# Patient Record
(demographics unavailable — no encounter records)

---

## 2025-02-04 NOTE — PHYSICAL EXAM
[Chaperone Present] : A chaperone was present in the examining room during all aspects of the physical examination [[id number]] : A chaperone was present during the pelvic exam. [No Acute Distress] : in no acute distress [Well developed] : well developed [Well Nourished] : ~L well nourished [Good Hygeine] : demonstrates good hygeine [Oriented x3] : oriented to person, place, and time [Normal Memory] : ~T memory was ~L unimpaired [Normal Mood/Affect] : mood and affect are normal [Warm and Dry] : was warm and dry to touch [Normal Gait] : gait was normal [Vulvar Atrophy] : vulvar atrophy [Labia Majora] : were normal [Labia Minora] : were normal [Atrophy] : atrophy [Erythematous] : erythema [Dry Mucosa] : dry mucosa [Discharge] : a  ~M vaginal discharge was present [Arabella] : yellow [Thin] : thin [Scant] : there was scant vaginal bleeding [Normal] : normal [FreeTextEntry2] : Shade, MA [Anxiety] : patient is not anxious [Tenderness] : ~T no ~M abdominal tenderness observed [Distended] : not distended [de-identified] : tenderness to touch noted at lower left buttocks/upper left leg  [FreeTextEntry4] : scant bleeding noted at posterior vaginal vault during speculum exam

## 2025-02-04 NOTE — DISCUSSION/SUMMARY
[FreeTextEntry1] : #POP: -Continue with OR #3. Advised to perform self-care weekly.  -Discussed that intermittent vaginal spotting noted on pad likely secondary to erythema and vaginal bleeding noted at posterior vaginal vault during speculum exam.  -Pessary and bleeding precautions reviewed. -Advised to use vaginal estrogen as prescribed. Rx sent to pharmacy on file. -Recommended to use OTC Replens 2-3 times a week at bedtime on alternate days as estrogen.  -Reinforced the importance of preventing constipation. Can use OTC stool softener or MiraLAX as needed.   #Left buttocks/upper leg pain: -Discussed that left buttocks pain/inability to walk very unlikely to be caused by pessary.  -Strongly advised to follow up with PCP to find source of pain. Patient understands and in agreement.   RTO in 3 months or sooner for pessary care. All questions answered to pt satisfaction.  Pt can call the office with any additional questions or concerns; pt verbalized understanding.

## 2025-02-04 NOTE — PHYSICAL EXAM
[Chaperone Present] : A chaperone was present in the examining room during all aspects of the physical examination [[id number]] : A chaperone was present during the pelvic exam. [No Acute Distress] : in no acute distress [Well developed] : well developed [Well Nourished] : ~L well nourished [Good Hygeine] : demonstrates good hygeine [Oriented x3] : oriented to person, place, and time [Normal Memory] : ~T memory was ~L unimpaired [Normal Mood/Affect] : mood and affect are normal [Warm and Dry] : was warm and dry to touch [Normal Gait] : gait was normal [Vulvar Atrophy] : vulvar atrophy [Labia Majora] : were normal [Labia Minora] : were normal [Atrophy] : atrophy [Erythematous] : erythema [Dry Mucosa] : dry mucosa [Discharge] : a  ~M vaginal discharge was present [Arabella] : yellow [Thin] : thin [Scant] : there was scant vaginal bleeding [Normal] : normal [FreeTextEntry2] : Shade, MA [Anxiety] : patient is not anxious [Tenderness] : ~T no ~M abdominal tenderness observed [Distended] : not distended [de-identified] : tenderness to touch noted at lower left buttocks/upper left leg  [FreeTextEntry4] : scant bleeding noted at posterior vaginal vault during speculum exam

## 2025-02-04 NOTE — PROCEDURE
[Good Fit] : fits well [Erythema] : erythema noted [Discharge] : there is vaginal discharge [Pessary Inserted] : inserted [Pessary Washed] : washed [Mild] : mild bleeding [Resolved w/pressure] : was resolved by applying pressure [Medication Review] : Medicaiton Review: Patient verbalizes understanding of risks and benefits [Bladder Training] : Bladder Training: Patient given information with verbal understanding [Refit] : refit is not needed [Erosion] : no evidence of erosion [Infection] : no evidence of infection [FreeTextEntry1] : OR #3 [de-identified] : posterior vaginal vault  [de-identified] : scant bleeding noted during speculum exam likely secondary to irritation from pessary as evidenced by erythema at posterior vaginal vault [FreeTextEntry7] : stool softener/Miralax  [FreeTextEntry3] : Estrogen/Replens  [FreeTextEntry8] : Reviewed pericare

## 2025-02-04 NOTE — PROCEDURE
[Good Fit] : fits well [Erythema] : erythema noted [Discharge] : there is vaginal discharge [Pessary Inserted] : inserted [Pessary Washed] : washed [Mild] : mild bleeding [Resolved w/pressure] : was resolved by applying pressure [Medication Review] : Medicaiton Review: Patient verbalizes understanding of risks and benefits [Bladder Training] : Bladder Training: Patient given information with verbal understanding [Refit] : refit is not needed [Erosion] : no evidence of erosion [Infection] : no evidence of infection [FreeTextEntry1] : OR #3 [de-identified] : posterior vaginal vault  [de-identified] : scant bleeding noted during speculum exam likely secondary to irritation from pessary as evidenced by erythema at posterior vaginal vault [FreeTextEntry3] : Estrogen/Replens  [FreeTextEntry7] : stool softener/Miralax  [FreeTextEntry8] : Reviewed pericare

## 2025-02-04 NOTE — HISTORY OF PRESENT ILLNESS
[FreeTextEntry1] : Sandee is an 86-year-old with hx of POP supported with an OR #3 who was last seen on 6/12/23 and presents to the office for follow up. She has been unable to come into the office due to family emergencies. She is happy with the support provided by the pessary and performs self-care monthly and leaves the pessary out for 3 days. She reports intermittent vaginal spotting noted on her pad. She denies any issues voiding. She reports intermittent constipation for which she performs digital disimpaction. She will alternate between using vaginal estrogen one week and then OTC Replens the next week. She is requesting a new prescription for estrogen at this time.   Of note, Sandee reports that she had a pain in her left buttocks that radiated down her leg last week and she was unable to walk for a few days. She states that she did not fall and attributed it to either sciatica or insertion of the pessary. Her pessary was already in place when this occurred, and she did not remove it. Today, Sandee states that the pain has improved but she still feels some tenderness.

## 2025-05-14 NOTE — HISTORY OF PRESENT ILLNESS
[FreeTextEntry1] : Sandee presents for follow up with a h/o prolapse supported with an OR #3. She recently had episodes of intermittent bleeding and removed her pessary. Her pessary had been out for ~2-3 weeks and remains out for POPQ exam today. She normally performs self-care monthly and leaves the pessary out for a few days. prior to reinsertion. She denies any issues voiding.

## 2025-05-14 NOTE — DISCUSSION/SUMMARY
[FreeTextEntry1] : Sandee presents with a cystocele desiring pessary reinsertion. She is not interested in surgical management. She was refitted with a smaller open ring pessary, will continue self-care but will remove pessary every 1-2 weeks and leave out for a few days prior to reinsertion. She was encouraged to use vaginal estrogen biw as prescribed. She will RTO in 3 mo for follow up, or sooner if issues arise. Pessary precautions and instructions reviewed

## 2025-05-14 NOTE — PHYSICAL EXAM
[No Acute Distress] : in no acute distress [Oriented x3] : oriented to person, place, and time [Normal Mood/Affect] : mood and affect are normal [Vulvar Atrophy] : vulvar atrophy [Labia Majora] : were normal [Atrophy] : atrophy [Normal] : no abnormalities [Anxiety] : patient is not anxious [Tenderness] : ~T no ~M abdominal tenderness observed [Distended] : not distended [Aa ____] : Aa [unfilled] [Ba ____] : Ba [unfilled] [TVL ____] : TVL  [unfilled] [de-identified] : vagina adhesed at apex, unable to palpate cervix. adhesions friable.

## 2025-05-15 NOTE — DISCUSSION/SUMMARY
[FreeTextEntry1] : #POP: -After discussion with Dr. Harris, recommended that OR #3 continued to be left out for pelvic rest. Advised patient to schedule appointment for follow up with Dr. Harris and keep pessary out until that visit. Patient understands and in agreement.  -Discussed that intermittent vaginal spotting noted on pad likely secondary to erythema and vaginal bleeding noted at posterior vaginal vault during speculum exam.  -Bleeding precautions reviewed. -Advised to use vaginal estrogen twice a week as prescribed.  -Recommended to use OTC Replens 2-3 times a week at bedtime on alternate days as estrogen.  -Reinforced the importance of preventing constipation. Can use OTC stool softener or MiraLAX as needed.  -Reviewed voiding assist techniques (double void, leaning assist, crede, reducing prolapse). Patient understands and in agreement.  RTO on 5/14/25 for follow up with Dr. Harris or sooner if needed. All questions answered to pt satisfaction.  Pt can call the office with any additional questions or concerns; pt verbalized understanding.

## 2025-05-15 NOTE — PHYSICAL EXAM
[MA] : MA [No Acute Distress] : in no acute distress [Well developed] : well developed [Well Nourished] : ~L well nourished [Good Hygeine] : demonstrates good hygeine [Oriented x3] : oriented to person, place, and time [Normal Memory] : ~T memory was ~L unimpaired [Normal Mood/Affect] : mood and affect are normal [Warm and Dry] : was warm and dry to touch [Normal Gait] : gait was normal [Vulvar Atrophy] : vulvar atrophy [Labia Majora] : were normal [Labia Minora] : were normal [Atrophy] : atrophy [Erythematous] : erythema [Dry Mucosa] : dry mucosa [Cystocele] : a cystocele [Scant] : there was scant vaginal bleeding [Normal] : normal [FreeTextEntry2] : Rodrigue [Anxiety] : patient is not anxious [FreeTextEntry4] : scant bleeding noted at posterior vaginal vault during speculum exam

## 2025-05-15 NOTE — HISTORY OF PRESENT ILLNESS
[FreeTextEntry1] : Sandee is an 86-year-old with hx of POP supported with an OR #3 who presents to the office for follow up. She reports intermittent vaginal bleeding 2 weeks ago noted on her pad. She removed the pessary 1 week ago and the bleeding has since resolved. The pessary remains out today. She normally performs self-care monthly and leaves the pessary out for a few days, however she has not performed self-care since her last visit (2/4/25). She denies any issues voiding. She reports intermittent constipation for which she sometimes performs digital disimpaction. She will alternate between using vaginal estrogen and OTC Replens.    no

## 2025-05-15 NOTE — PROCEDURE
[Good Fit] : fits well [Erythema] : erythema noted [Pessary Out] : removed [Mild] : mild bleeding [Resolved w/pressure] : was resolved by applying pressure [Medication Review] : Medicaiton Review: Patient verbalizes understanding of risks and benefits [Bladder Training] : Bladder Training: Patient given information with verbal understanding [Refit] : refit is not needed [Erosion] : no evidence of erosion [Discharge] : no vaginal discharge [Infection] : no evidence of infection [FreeTextEntry1] : OR #3 [de-identified] : posterior vaginal vault  [de-identified] : scant bleeding noted during speculum exam likely secondary to irritation from pessary as evidenced by erythema at posterior vaginal vault [FreeTextEntry3] : Estrogen/Replens  [FreeTextEntry7] : stool softener/Miralax  [FreeTextEntry8] : Reviewed pericare